# Patient Record
Sex: MALE | Race: BLACK OR AFRICAN AMERICAN | NOT HISPANIC OR LATINO | Employment: UNEMPLOYED | URBAN - METROPOLITAN AREA
[De-identification: names, ages, dates, MRNs, and addresses within clinical notes are randomized per-mention and may not be internally consistent; named-entity substitution may affect disease eponyms.]

---

## 2024-03-19 ENCOUNTER — OFFICE VISIT (OUTPATIENT)
Dept: CARDIOLOGY | Facility: CLINIC | Age: 64
End: 2024-03-19
Payer: MEDICARE

## 2024-03-19 VITALS
HEIGHT: 69 IN | DIASTOLIC BLOOD PRESSURE: 78 MMHG | SYSTOLIC BLOOD PRESSURE: 122 MMHG | WEIGHT: 168.06 LBS | OXYGEN SATURATION: 98 % | BODY MASS INDEX: 24.89 KG/M2 | HEART RATE: 61 BPM

## 2024-03-19 DIAGNOSIS — E78.5 HYPERLIPIDEMIA, UNSPECIFIED HYPERLIPIDEMIA TYPE: ICD-10-CM

## 2024-03-19 DIAGNOSIS — I10 HYPERTENSION, UNSPECIFIED TYPE: ICD-10-CM

## 2024-03-19 DIAGNOSIS — Z98.62 STATUS POST ANGIOPLASTY: ICD-10-CM

## 2024-03-19 DIAGNOSIS — F17.200 TOBACCO DEPENDENCE: Primary | ICD-10-CM

## 2024-03-19 DIAGNOSIS — I25.10 CORONARY ARTERY DISEASE INVOLVING NATIVE CORONARY ARTERY OF NATIVE HEART, UNSPECIFIED WHETHER ANGINA PRESENT: ICD-10-CM

## 2024-03-19 DIAGNOSIS — I42.9 CARDIOMYOPATHY, UNSPECIFIED TYPE (MULTI): ICD-10-CM

## 2024-03-19 PROBLEM — K62.5 BRBPR (BRIGHT RED BLOOD PER RECTUM): Status: ACTIVE | Noted: 2024-03-19

## 2024-03-19 PROBLEM — R93.1 ELEVATED CORONARY ARTERY CALCIUM SCORE: Status: ACTIVE | Noted: 2024-03-19

## 2024-03-19 PROBLEM — R07.9 CHEST PAIN: Status: ACTIVE | Noted: 2024-03-19

## 2024-03-19 PROBLEM — R10.32 SEVERE LEFT GROIN PAIN: Status: ACTIVE | Noted: 2024-03-19

## 2024-03-19 PROBLEM — R07.9 CHEST PAIN: Status: RESOLVED | Noted: 2024-03-19 | Resolved: 2024-03-19

## 2024-03-19 PROCEDURE — 3078F DIAST BP <80 MM HG: CPT | Performed by: INTERNAL MEDICINE

## 2024-03-19 PROCEDURE — 3074F SYST BP LT 130 MM HG: CPT | Performed by: INTERNAL MEDICINE

## 2024-03-19 PROCEDURE — 99214 OFFICE O/P EST MOD 30 MIN: CPT | Performed by: INTERNAL MEDICINE

## 2024-03-19 RX ORDER — METOPROLOL SUCCINATE 50 MG/1
1 TABLET, EXTENDED RELEASE ORAL DAILY
COMMUNITY
Start: 2019-04-05 | End: 2024-03-19 | Stop reason: SDUPTHER

## 2024-03-19 RX ORDER — AMOXICILLIN AND CLAVULANATE POTASSIUM 500; 125 MG/1; MG/1
TABLET, FILM COATED ORAL EVERY 12 HOURS
COMMUNITY
Start: 2019-04-22 | End: 2024-03-19 | Stop reason: ALTCHOICE

## 2024-03-19 RX ORDER — ACETAMINOPHEN AND CODEINE PHOSPHATE 300; 30 MG/1; MG/1
TABLET ORAL EVERY 8 HOURS PRN
COMMUNITY
Start: 2019-04-22 | End: 2024-03-19 | Stop reason: ALTCHOICE

## 2024-03-19 RX ORDER — AMLODIPINE BESYLATE 10 MG/1
1 TABLET ORAL DAILY
COMMUNITY
Start: 2019-05-03 | End: 2024-03-19 | Stop reason: SDUPTHER

## 2024-03-19 RX ORDER — NITROGLYCERIN 0.4 MG/1
TABLET SUBLINGUAL
COMMUNITY
Start: 2017-08-16

## 2024-03-19 RX ORDER — LISINOPRIL 40 MG/1
1 TABLET ORAL DAILY
COMMUNITY
Start: 2018-12-31 | End: 2024-03-19 | Stop reason: SDUPTHER

## 2024-03-19 RX ORDER — ASPIRIN 81 MG/1
TABLET ORAL
COMMUNITY
Start: 2017-08-01

## 2024-03-19 ASSESSMENT — ENCOUNTER SYMPTOMS
FALLS: 0
NAUSEA: 0
FEVER: 0
HEADACHES: 0
COUGH: 0
MYALGIAS: 0
BLOATING: 0
MEMORY LOSS: 0
CONSTIPATION: 0
DIARRHEA: 0
ABDOMINAL PAIN: 0
VOMITING: 0
WHEEZING: 0
ALTERED MENTAL STATUS: 0
DEPRESSION: 0
DYSURIA: 0
CHILLS: 0
HEMOPTYSIS: 0
HEMATURIA: 0

## 2024-03-19 ASSESSMENT — PAIN SCALES - GENERAL: PAINLEVEL: 0-NO PAIN

## 2024-03-19 NOTE — PROGRESS NOTES
Chief complaint:  FU     HPI  64 yo BM w/ h/o CAD s/p LCx/RCA stents 9/17, mild CM, AFLUT x1 (9/17), HPL, TOB now here for cardiology f/u. He is feeling good. No further CP. No further. No further L shoulder/neck pain.   No dyspnea. No palps. No LH/dizziness/syncope. No edema. No claudication. No cough.   BP at home: ~130/80  ECG 4/15: SR (81)  ECG 8/17: SR (71), nonsp T-wave changes  ECG 9/17: SB (54), inflat St-T changes  ECG 9/17: SB (59), PVC, ST-T changes  ECG 9/17: SB (53), ST-T changes  ECG 9/17: AFLUT (127)  ECG 9/18: SB (56), bigeminal PVCs, ST-T changes  ECG 9/19: SR (61), nonsp T-wave changes  ECG 10/22: SR (61), PVC, nonsp T-wave changes  Echo 9/17: EF 40-45%  Echo 8/21: TDS, EF 45%, DD, valves ok  ETT 8/17: CP + 2.5mm ST dep  CCS 8/17: 188 (LM 0, , LCx 0, RCA 72), small L lung nodularity  Cath 9/17: LM ok, mLAD  (L->L collaterals), pLCx 90% (BOWEN), OM1 , p-dRCA 80% (DESx2)  CXR 4/15: no acute abnl  CXR 9/17: no acute abnl     Review of Systems   Constitutional: Negative for chills, fever and malaise/fatigue.   HENT:  Negative for hearing loss.    Eyes:  Negative for visual disturbance.   Respiratory:  Negative for cough, hemoptysis and wheezing.    Skin:  Negative for rash.   Musculoskeletal:  Negative for falls and myalgias.   Gastrointestinal:  Negative for bloating, abdominal pain, constipation, diarrhea, dysphagia, nausea and vomiting.   Genitourinary:  Negative for dysuria and hematuria.   Neurological:  Negative for headaches.   Psychiatric/Behavioral:  Negative for altered mental status, depression and memory loss.       Social History     Tobacco Use   Smoking Status Every Day    Types: Cigarettes   Smokeless Tobacco Not on file      Family History   Problem Relation Name Age of Onset    Other (HTN) Mother      Other (HTN) Father        No Known Allergies     Current Outpatient Medications   Medication Instructions    amLODIPine (NORVASC) 10 mg, oral, Daily    aspirin 81 mg EC tablet  oral    atorvastatin (LIPITOR) 80 mg, oral, Daily    clopidogrel (PLAVIX) 75 mg, oral, Daily    lisinopril 80 mg, oral, Daily    metoprolol succinate XL (TOPROL-XL) 50 mg, oral, Daily    nitroglycerin (Nitrostat) 0.4 mg SL tablet sublingual      Vitals:    03/19/24 1529   BP: 122/78   Pulse:    SpO2:       Physical Exam  Constitutional:       Appearance: Normal appearance.   HENT:      Head: Normocephalic and atraumatic.      Nose: Nose normal.   Neck:      Vascular: No carotid bruit.   Cardiovascular:      Rate and Rhythm: Normal rate and regular rhythm.      Heart sounds: No murmur heard.  Pulmonary:      Effort: Pulmonary effort is normal.      Breath sounds: Normal breath sounds.   Abdominal:      Palpations: Abdomen is soft.      Tenderness: There is no abdominal tenderness.   Musculoskeletal:      Right lower leg: No edema.      Left lower leg: No edema.   Skin:     General: Skin is warm and dry.   Neurological:      General: No focal deficit present.      Mental Status: He is alert.   Psychiatric:         Mood and Affect: Mood normal.         Judgment: Judgment normal.        Results/Data  2/21 Cr 0.92, K 4.3, LFT nl, LDL 66, HDL 53, , Chol 160, HGB 15,   5/18 Cr 0.94, K 4.6, LFT nl, LDL 77, HDL 49, , Chol 158, HGB 15,   9/17 Cr 0.98, K 4.1, Mg 2.18, LFT nl, LDL **, HDL 36, , Chol 257, HGB 16.2, , hgba1c 5.7, TSH 1.54, TPN neg, BNP 85  4/15 Cr 0.87, K 3.6, HGB 13, , TPN 0.1     Assessment/Plan   62 yo BM w/ h/o CAD s/p LCx/RCA stents 9/17, mild CM, AFLUT x1 (9/17), HPL, TOB. Doing well. No cardiac symptoms. Refer to smoking cessation clinic.  EF remains very mildly reduced. Continue BB/ACE.  -continue ASA 81 qd  -continue Plavix 75 qd  -continue Metoprolol Succinate 50 qd (HR occ low to increase)  -continue Lisinopril 80 qd  -continue Amlodipine 10 qd  -continue Atorva 80 qhs -> goal LDL <70  -f/u 6 months (earlier if needed)     Tee Jason MD

## 2024-03-27 ENCOUNTER — CLINICAL SUPPORT (OUTPATIENT)
Dept: CARDIAC REHAB | Facility: CLINIC | Age: 64
End: 2024-03-27
Payer: MEDICARE

## 2024-03-27 DIAGNOSIS — F17.210 CIGARETTE SMOKER: ICD-10-CM

## 2024-03-27 DIAGNOSIS — F17.200 TOBACCO DEPENDENCE: ICD-10-CM

## 2024-03-27 PROCEDURE — 99407 BEHAV CHNG SMOKING > 10 MIN: CPT | Performed by: INTERNAL MEDICINE

## 2024-03-29 NOTE — PROGRESS NOTES
Tobacco Treatment Counseling- Initial Visit    Name: Tee Oneill            MRN: [id number]          YOB: 1960           Age: 63 y.o.                  Today’s Date: 3/29/2024  Primary Care Physician: No primary care provider on file.  Referring Physician: Tee Jason MD  [facility] [place] [id number]         Start time: 7:59 AM   End time: 8:35 AM      Tee Oneill presents for initial session for Tobacco Treatment Counseling. Patient currently smoking 1 PPD and has been smoking since the age of 14. Patient has a high dependence on nicotine according to the [employer] nicotine dependence assessment. At this time, patient is highly motivated to quit smoking. He is a [employer] and he says that they are not supposed to smoke, so he would really like to quit to be compliant with this. See below for detailed assessment of tobacco use and treatment plan.       Smoking triggers/routines:  driving (drives often to visit family and friends in [place]), coffee, alcohol (drinks a beer every day), work breaks (works as a ), stress/anxiety, frustration    Past quit attempts:  several years ago, does not remember much about quit attempt    Potential barriers to quitting:  does smoke inside the home, high dependence    Strengths:  high motivation/commitment    Medication plan:  patient has used nicotine patches in the past, will consider use of NRT again if needed    Coping strategies:  portioning out cigarettes every day, tracking cigarettes, substitutes/replacements (straws, toothpicks, beverage stirrers, etc.)    Next steps:  reduce to quit method, goal for this week is to cut down to 10 CPD (will portion out cigarettes as well as track smoking) >> patient also interested in [facility] [place], gave information    Follow-up scheduled 4/3/24 to evaluate progress and make any necessary changes to quit plan. Patient to call office at [phone] with any  questions/concerns.    Tobacco Treatment Specialist- Brittni Ventura RN

## 2024-05-02 ENCOUNTER — OFFICE VISIT (OUTPATIENT)
Dept: PRIMARY CARE | Facility: CLINIC | Age: 64
End: 2024-05-02
Payer: COMMERCIAL

## 2024-05-02 VITALS
BODY MASS INDEX: 25.2 KG/M2 | OXYGEN SATURATION: 95 % | SYSTOLIC BLOOD PRESSURE: 114 MMHG | DIASTOLIC BLOOD PRESSURE: 78 MMHG | HEIGHT: 69 IN | WEIGHT: 170.1 LBS | HEART RATE: 73 BPM | RESPIRATION RATE: 16 BRPM

## 2024-05-02 DIAGNOSIS — E78.5 HYPERLIPIDEMIA, UNSPECIFIED HYPERLIPIDEMIA TYPE: ICD-10-CM

## 2024-05-02 DIAGNOSIS — F10.20 UNCOMPLICATED ALCOHOL DEPENDENCE (MULTI): ICD-10-CM

## 2024-05-02 DIAGNOSIS — Z12.11 COLON CANCER SCREENING: Primary | ICD-10-CM

## 2024-05-02 DIAGNOSIS — Z98.62 STATUS POST ANGIOPLASTY: ICD-10-CM

## 2024-05-02 DIAGNOSIS — Z00.00 ANNUAL PHYSICAL EXAM: ICD-10-CM

## 2024-05-02 DIAGNOSIS — I25.10 CORONARY ARTERY DISEASE INVOLVING NATIVE CORONARY ARTERY OF NATIVE HEART, UNSPECIFIED WHETHER ANGINA PRESENT: ICD-10-CM

## 2024-05-02 DIAGNOSIS — R53.83 OTHER FATIGUE: ICD-10-CM

## 2024-05-02 DIAGNOSIS — I10 PRIMARY HYPERTENSION: ICD-10-CM

## 2024-05-02 DIAGNOSIS — F17.211 TOBACCO DEPENDENCE DUE TO CIGARETTES, IN REMISSION: ICD-10-CM

## 2024-05-02 DIAGNOSIS — N40.0 BENIGN PROSTATIC HYPERPLASIA WITHOUT LOWER URINARY TRACT SYMPTOMS: ICD-10-CM

## 2024-05-02 DIAGNOSIS — F17.200 TOBACCO DEPENDENCE SYNDROME: ICD-10-CM

## 2024-05-02 DIAGNOSIS — K62.5 BRBPR (BRIGHT RED BLOOD PER RECTUM): ICD-10-CM

## 2024-05-02 PROCEDURE — 3078F DIAST BP <80 MM HG: CPT | Performed by: INTERNAL MEDICINE

## 2024-05-02 PROCEDURE — 99386 PREV VISIT NEW AGE 40-64: CPT | Performed by: INTERNAL MEDICINE

## 2024-05-02 PROCEDURE — 99214 OFFICE O/P EST MOD 30 MIN: CPT | Performed by: INTERNAL MEDICINE

## 2024-05-02 PROCEDURE — 3074F SYST BP LT 130 MM HG: CPT | Performed by: INTERNAL MEDICINE

## 2024-05-02 ASSESSMENT — ENCOUNTER SYMPTOMS
ALLERGIC/IMMUNOLOGIC NEGATIVE: 1
NEUROLOGICAL NEGATIVE: 1
CONSTITUTIONAL NEGATIVE: 1
CARDIOVASCULAR NEGATIVE: 1
HEMATOLOGIC/LYMPHATIC NEGATIVE: 1
EYES NEGATIVE: 1
GASTROINTESTINAL NEGATIVE: 1
OCCASIONAL FEELINGS OF UNSTEADINESS: 0
DEPRESSION: 0
RESPIRATORY NEGATIVE: 1
LOSS OF SENSATION IN FEET: 0
PSYCHIATRIC NEGATIVE: 1
MUSCULOSKELETAL NEGATIVE: 1
ENDOCRINE NEGATIVE: 1

## 2024-05-02 ASSESSMENT — PAIN SCALES - GENERAL: PAINLEVEL: 0-NO PAIN

## 2024-05-02 ASSESSMENT — PATIENT HEALTH QUESTIONNAIRE - PHQ9
2. FEELING DOWN, DEPRESSED OR HOPELESS: NOT AT ALL
SUM OF ALL RESPONSES TO PHQ9 QUESTIONS 1 AND 2: 0
1. LITTLE INTEREST OR PLEASURE IN DOING THINGS: NOT AT ALL

## 2024-05-02 NOTE — ASSESSMENT & PLAN NOTE
HTN - hypertension well/controlled .Target BP < 130/80  achieved. Educate low salt diet and exercise with weight loss. Educate home self monitoring and diary keeping. Educate risks of elevate blood pressure and benefits of prompt treatment.  Refill lisinopril and amlodipine

## 2024-05-02 NOTE — ASSESSMENT & PLAN NOTE
CAD-coronary artery disease-patient has a history of myocardial infarction/stent placed in stenosed coronary arteries. Target LDL should be below 70 milligrams per deciliter. Reviewed EKG which shows no significant changes. Follows with his cardiologist/ Dr. Huizar. He needs to call us with any new symptoms of angina or shortness of breath. Last stress test was/last coronary catheterization was/. Need to address risk factors BioCore controlling cholesterol blood pressure and diabetes. Educate extensively diet. Patient needs to follow in rehabilitation/mild exercises daily.

## 2024-05-02 NOTE — PROGRESS NOTES
"Subjective   Patient ID: Tee Oneill is a 63 y.o. male who presents for New Patient Visit (Npv/Interested in a colonoscopy).    HPI     Review of Systems   Constitutional: Negative.    HENT: Negative.     Eyes: Negative.    Respiratory: Negative.     Cardiovascular: Negative.    Gastrointestinal: Negative.    Endocrine: Negative.    Musculoskeletal: Negative.    Skin: Negative.    Allergic/Immunologic: Negative.    Neurological: Negative.    Hematological: Negative.    Psychiatric/Behavioral: Negative.     All other systems reviewed and are negative.      Objective   /78 (BP Location: Left arm, Patient Position: Sitting)   Pulse 73   Resp 16   Ht 1.753 m (5' 9\")   Wt 77.2 kg (170 lb 1.6 oz)   SpO2 95%   BMI 25.12 kg/m²     Physical Exam  Vitals and nursing note reviewed.   Constitutional:       Appearance: Normal appearance.   HENT:      Head: Normocephalic and atraumatic.      Right Ear: Tympanic membrane, ear canal and external ear normal.      Left Ear: Tympanic membrane, ear canal and external ear normal. There is no impacted cerumen.      Nose: Nose normal.      Mouth/Throat:      Mouth: Mucous membranes are moist.      Pharynx: Oropharynx is clear.   Eyes:      Extraocular Movements: Extraocular movements intact.      Conjunctiva/sclera: Conjunctivae normal.      Pupils: Pupils are equal, round, and reactive to light.   Cardiovascular:      Rate and Rhythm: Normal rate and regular rhythm.      Pulses: Normal pulses.      Heart sounds: Normal heart sounds. No murmur heard.  Pulmonary:      Effort: Pulmonary effort is normal. No respiratory distress.      Breath sounds: Normal breath sounds. No stridor. No wheezing, rhonchi or rales.   Chest:      Chest wall: No tenderness.   Abdominal:      General: Abdomen is flat. Bowel sounds are normal. There is no distension.      Palpations: Abdomen is soft. There is no mass.      Tenderness: There is no abdominal tenderness. There is no right CVA " tenderness, left CVA tenderness, guarding or rebound.      Hernia: No hernia is present.   Musculoskeletal:         General: Normal range of motion.      Cervical back: Normal range of motion and neck supple.   Skin:     General: Skin is warm.      Capillary Refill: Capillary refill takes less than 2 seconds.   Neurological:      General: No focal deficit present.      Mental Status: He is alert.      Cranial Nerves: No cranial nerve deficit.      Sensory: No sensory deficit.      Motor: No weakness.      Coordination: Coordination normal.      Gait: Gait normal.      Deep Tendon Reflexes: Reflexes normal.   Psychiatric:         Mood and Affect: Mood normal.         Behavior: Behavior normal. Behavior is cooperative.         Thought Content: Thought content normal.         Judgment: Judgment normal.         Assessment/Plan   Problem List Items Addressed This Visit             ICD-10-CM    BRBPR (bright red blood per rectum) K62.5     Refer to colonoscopy          CAD (coronary artery disease) I25.10     CAD-coronary artery disease-patient has a history of myocardial infarction/stent placed in stenosed coronary arteries. Target LDL should be below 70 milligrams per deciliter. Reviewed EKG which shows no significant changes. Follows with his cardiologist/ Dr. Huizar. He needs to call us with any new symptoms of angina or shortness of breath. Last stress test was/last coronary catheterization was/. Need to address risk factors BioCore controlling cholesterol blood pressure and diabetes. Educate extensively diet. Patient needs to follow in rehabilitation/mild exercises daily.          Relevant Orders    Comprehensive Metabolic Panel    Lipid Panel    HTN (hypertension) I10     HTN - hypertension well/controlled .Target BP < 130/80  achieved. Educate low salt diet and exercise with weight loss. Educate home self monitoring and diary keeping. Educate risks of elevate blood pressure and benefits of prompt treatment.   Refill lisinopril and amlodipine         Hyperlipidemia E78.5     Hypercholesterolemia - Monitor lipid profile and educate patient upon risks of high cholesterol and targets. Educate diet and change in lifestyle and increase in exercises - Refill:  Atorvastatin    and educate compliance with medication and diet.           Relevant Orders    Lipid Panel    Status post angioplasty Z98.62     Status Post coronary stenting in 2019          Uncomplicated alcohol dependence (Multi) F10.20    Tobacco dependence syndrome F17.200     Quit tobacco one month ago          Relevant Orders    CT lung screening low dose    Other fatigue R53.83    Relevant Orders    CBC and Auto Differential    TSH with reflex to Free T4 if abnormal    Tobacco dependence due to cigarettes, in remission F17.211    Relevant Orders    CT lung screening low dose    Annual physical exam Z00.00     No recent hospitalizations.    All medications reviewed and reconciled by me the provider..  No use of controlled substances or opiates.    Family history, social history reviewed, no changes.    Patient does not smoke.    Patient does not drink.    Patient hydrates adequately daily.  Eats a well-balanced healthy diet.     Exercises adequately including walking and doing weightbearing exercises.    Patient denies any difficulty with memory or cognition.     Denies any difficulty with hearing.  Patient does not wear hearing aids.    No fall risk.  No recent falls.  Denies any difficulty walking.    Patient with no history of depression anxiety, denies any loss of interest, no feeling of sadness, no lack of motivation.    Patient is independent in all ADLs and IADLs.  Independent bathing, dressing, walking.  Takes care of own finances, shopping and cooking.     End-of-life decision-making power of  reviewed with patient.     Risk Factors Identified During Visit: None.   Influenza: influenza vaccine was previously given.   Pneumovax 23: Pneumovax 23  vaccine was previously given.   Prevnar 13: Prevnar 13 vaccine was previously given.   Shingles Vaccine: Shingles vaccine was previously given.   Prostate cancer screening: Screening is current.   Colorectal Cancer Screening: screening is current.   Abdominal Aortic Aneurysm screening: screening is current.   HIV screening: screening not indicated.       Preventive measures - Recommend ASAP : . Colonoscopy (educate patient risks of colon cancer) refer patient to GI specialist. Ophthalmology and retina exam recommend yearly exams refer patient to an Ophthalmologist. BPH - (Benign Prostatic Hypertrophy) refer patient to an Urologist for rectal exam and PSA check.           Other Visit Diagnoses         Codes    Colon cancer screening    -  Primary Z12.11    Relevant Orders    Colonoscopy Screening; Average Risk Patient    Benign prostatic hyperplasia without lower urinary tract symptoms     N40.0    Relevant Orders    Prostate Specific Antigen

## 2024-05-14 ENCOUNTER — HOSPITAL ENCOUNTER (OUTPATIENT)
Dept: RADIOLOGY | Facility: CLINIC | Age: 64
Discharge: HOME | End: 2024-05-14
Payer: COMMERCIAL

## 2024-05-14 ENCOUNTER — APPOINTMENT (OUTPATIENT)
Dept: PRIMARY CARE | Facility: CLINIC | Age: 64
End: 2024-05-14
Payer: COMMERCIAL

## 2024-05-14 DIAGNOSIS — F17.200 TOBACCO DEPENDENCE SYNDROME: ICD-10-CM

## 2024-05-14 DIAGNOSIS — F17.211 TOBACCO DEPENDENCE DUE TO CIGARETTES, IN REMISSION: ICD-10-CM

## 2024-05-14 PROCEDURE — 71271 CT THORAX LUNG CANCER SCR C-: CPT

## 2024-05-30 ENCOUNTER — OFFICE VISIT (OUTPATIENT)
Dept: PRIMARY CARE | Facility: CLINIC | Age: 64
End: 2024-05-30
Payer: COMMERCIAL

## 2024-05-30 VITALS
HEIGHT: 69 IN | HEART RATE: 58 BPM | SYSTOLIC BLOOD PRESSURE: 120 MMHG | DIASTOLIC BLOOD PRESSURE: 70 MMHG | RESPIRATION RATE: 16 BRPM | WEIGHT: 172 LBS | BODY MASS INDEX: 25.48 KG/M2 | OXYGEN SATURATION: 97 %

## 2024-05-30 DIAGNOSIS — I25.10 CORONARY ARTERY DISEASE INVOLVING NATIVE CORONARY ARTERY OF NATIVE HEART, UNSPECIFIED WHETHER ANGINA PRESENT: ICD-10-CM

## 2024-05-30 DIAGNOSIS — R35.0 BENIGN PROSTATIC HYPERPLASIA WITH URINARY FREQUENCY: ICD-10-CM

## 2024-05-30 DIAGNOSIS — I10 PRIMARY HYPERTENSION: Primary | ICD-10-CM

## 2024-05-30 DIAGNOSIS — E78.5 HYPERLIPIDEMIA, UNSPECIFIED HYPERLIPIDEMIA TYPE: ICD-10-CM

## 2024-05-30 DIAGNOSIS — F17.200 TOBACCO DEPENDENCE SYNDROME: ICD-10-CM

## 2024-05-30 DIAGNOSIS — N40.1 BENIGN PROSTATIC HYPERPLASIA WITH URINARY FREQUENCY: ICD-10-CM

## 2024-05-30 PROBLEM — N40.0 BENIGN PROSTATIC HYPERPLASIA: Status: ACTIVE | Noted: 2024-05-30

## 2024-05-30 PROCEDURE — 3078F DIAST BP <80 MM HG: CPT | Performed by: INTERNAL MEDICINE

## 2024-05-30 PROCEDURE — 99214 OFFICE O/P EST MOD 30 MIN: CPT | Performed by: INTERNAL MEDICINE

## 2024-05-30 PROCEDURE — 3074F SYST BP LT 130 MM HG: CPT | Performed by: INTERNAL MEDICINE

## 2024-05-30 ASSESSMENT — ENCOUNTER SYMPTOMS
LOSS OF SENSATION IN FEET: 0
HEMATOLOGIC/LYMPHATIC NEGATIVE: 1
ENDOCRINE NEGATIVE: 1
RESPIRATORY NEGATIVE: 1
CARDIOVASCULAR NEGATIVE: 1
DEPRESSION: 0
MUSCULOSKELETAL NEGATIVE: 1
OCCASIONAL FEELINGS OF UNSTEADINESS: 0
PSYCHIATRIC NEGATIVE: 1
CONSTITUTIONAL NEGATIVE: 1
EYES NEGATIVE: 1
GASTROINTESTINAL NEGATIVE: 1
NEUROLOGICAL NEGATIVE: 1
ALLERGIC/IMMUNOLOGIC NEGATIVE: 1

## 2024-05-30 ASSESSMENT — PATIENT HEALTH QUESTIONNAIRE - PHQ9
1. LITTLE INTEREST OR PLEASURE IN DOING THINGS: NOT AT ALL
SUM OF ALL RESPONSES TO PHQ9 QUESTIONS 1 AND 2: 0
2. FEELING DOWN, DEPRESSED OR HOPELESS: NOT AT ALL

## 2024-05-30 NOTE — PROGRESS NOTES
"Subjective   Patient ID: Tee Oneill is a 63 y.o. male who presents for Follow-up (1 month).    HPI     Review of Systems   Constitutional: Negative.    HENT: Negative.     Eyes: Negative.    Respiratory: Negative.     Cardiovascular: Negative.    Gastrointestinal: Negative.    Endocrine: Negative.    Musculoskeletal: Negative.    Skin: Negative.    Allergic/Immunologic: Negative.    Neurological: Negative.    Hematological: Negative.    Psychiatric/Behavioral: Negative.     All other systems reviewed and are negative.      Objective   Pulse 58   Resp 16   Ht 1.753 m (5' 9\")   Wt 78 kg (172 lb)   SpO2 97%   BMI 25.40 kg/m²   Blood pressure 120/70, pulse 58, resp. rate 16, height 1.753 m (5' 9\"), weight 78 kg (172 lb), SpO2 97%.   Physical Exam  Vitals and nursing note reviewed.   Constitutional:       Appearance: Normal appearance.   HENT:      Head: Normocephalic and atraumatic.      Right Ear: Tympanic membrane, ear canal and external ear normal.      Left Ear: Tympanic membrane, ear canal and external ear normal. There is no impacted cerumen.      Nose: Nose normal.      Mouth/Throat:      Mouth: Mucous membranes are moist.      Pharynx: Oropharynx is clear.   Eyes:      Extraocular Movements: Extraocular movements intact.      Conjunctiva/sclera: Conjunctivae normal.      Pupils: Pupils are equal, round, and reactive to light.   Cardiovascular:      Rate and Rhythm: Normal rate and regular rhythm.      Pulses: Normal pulses.      Heart sounds: Normal heart sounds. No murmur heard.  Pulmonary:      Effort: Pulmonary effort is normal. No respiratory distress.      Breath sounds: Normal breath sounds. No stridor. No wheezing, rhonchi or rales.   Chest:      Chest wall: No tenderness.   Abdominal:      General: Abdomen is flat. Bowel sounds are normal. There is no distension.      Palpations: Abdomen is soft. There is no mass.      Tenderness: There is no abdominal tenderness. There is no right CVA " tenderness, left CVA tenderness, guarding or rebound.      Hernia: No hernia is present.   Musculoskeletal:         General: Normal range of motion.      Cervical back: Normal range of motion and neck supple.   Skin:     General: Skin is warm.      Capillary Refill: Capillary refill takes less than 2 seconds.   Neurological:      General: No focal deficit present.      Mental Status: He is alert.      Cranial Nerves: No cranial nerve deficit.      Sensory: No sensory deficit.      Motor: No weakness.      Coordination: Coordination normal.      Gait: Gait normal.      Deep Tendon Reflexes: Reflexes normal.   Psychiatric:         Mood and Affect: Mood normal.         Behavior: Behavior normal. Behavior is cooperative.         Thought Content: Thought content normal.         Judgment: Judgment normal.         Assessment/Plan   Problem List Items Addressed This Visit             ICD-10-CM    CAD (coronary artery disease) I25.10     CAD-coronary artery disease-patient has a history of myocardial infarction/stent placed in stenosed coronary arteries. Target LDL should be below 70 milligrams per deciliter. Reviewed EKG which shows no significant changes. Follows with his cardiologist/ Dr. Huizar. He needs to call us with any new symptoms of angina or shortness of breath. Last stress test was/last coronary catheterization was/. Need to address risk factors BioCore controlling cholesterol blood pressure and diabetes. Educate extensively diet. Patient needs to follow in rehabilitation/mild exercises daily.          HTN (hypertension) - Primary I10     HTN - hypertension well/controlled .Target BP < 130/80  achieved. Educate low salt diet and exercise with weight loss. Educate home self monitoring and diary keeping. Educate risks of elevate blood pressure and benefits of prompt treatment.  Refill lisinopril and amlodipine          Hyperlipidemia E78.5     Hypercholesterolemia - Monitor lipid profile and educate patient upon  risks of high cholesterol and targets. Educate diet and change in lifestyle and increase in exercises - Refill:  Atorvastatin    and educate compliance with medication and diet.           Tobacco dependence syndrome F17.200     Tobacco cessation - Educate risks of smoking (CAD/COPD/CANCER of the lung or urinary bladder/CVA). Educate life style changes and prescribe nicotine patch and Wellbutrin 150mg BID. Educate stress reduction.                                                                                      Benign prostatic hyperplasia N40.0     BPH - Benign PROSTATIC Hypertrophy, + Dysuria/Nocturia, check PSA, prescribe Flomax 0.4mg qD and Avodart 0.5 mg qD vs. Finasteride 5 mg qD.  Educate exercises and Change in life style, prescribe vitamin E 400 IU qD. Consider referral to urology.             BRBPR (bright red blood per rectum) K62.5        Refer to colonoscopy            CAD (coronary artery disease) I25.10       CAD-coronary artery disease-patient has a history of myocardial infarction/stent placed in stenosed coronary arteries. Target LDL should be below 70 milligrams per deciliter. Reviewed EKG which shows no significant changes. Follows with his cardiologist/ Dr. Huizar. He needs to call us with any new symptoms of angina or shortness of breath. Last stress test was/last coronary catheterization was/. Need to address risk factors BioCore controlling cholesterol blood pressure and diabetes. Educate extensively diet. Patient needs to follow in rehabilitation/mild exercises daily.            Relevant Orders     Comprehensive Metabolic Panel     Lipid Panel     HTN (hypertension) I10       HTN - hypertension well/controlled .Target BP < 130/80  achieved. Educate low salt diet and exercise with weight loss. Educate home self monitoring and diary keeping. Educate risks of elevate blood pressure and benefits of prompt treatment.  Refill lisinopril and amlodipine           Hyperlipidemia E78.5        Hypercholesterolemia - Monitor lipid profile and educate patient upon risks of high cholesterol and targets. Educate diet and change in lifestyle and increase in exercises - Refill:  Atorvastatin    and educate compliance with medication and diet.             Relevant Orders     Lipid Panel     Status post angioplasty Z98.62       Status Post coronary stenting in 2019            Uncomplicated alcohol dependence (Multi) F10.20     Tobacco dependence syndrome F17.200       Quit tobacco one month ago            Relevant Orders     CT lung screening low dose     Other fatigue R53.83     Relevant Orders     CBC and Auto Differential     TSH with reflex to Free T4 if abnormal     Tobacco dependence due to cigarettes, in remission F17.211     Relevant Orders     CT lung screening low dose                 Other Visit Diagnoses           Codes     Colon cancer screening    -  Primary Z12.11     Relevant Orders     Colonoscopy Screening; Average Risk Patient     Benign prostatic hyperplasia without lower urinary tract symptoms     N40.0     Relevant Orders     Prostate Specific Antigen                            Immunizations/Injections      very important  Immunizations from outside sources need reconciliation.      Pfizer Purple Cap SARS-CoV-211/18/2021, 4/6/2021, 3/16/2021  Zoster vaccine, recombinant, adult (SHINGRIX)9/12/2021, 4/29/2021

## 2024-05-31 ENCOUNTER — OFFICE VISIT (OUTPATIENT)
Dept: GASTROENTEROLOGY | Facility: CLINIC | Age: 64
End: 2024-05-31
Payer: COMMERCIAL

## 2024-05-31 VITALS — OXYGEN SATURATION: 97 % | WEIGHT: 172 LBS | BODY MASS INDEX: 25.48 KG/M2 | HEIGHT: 69 IN | HEART RATE: 60 BPM

## 2024-05-31 DIAGNOSIS — K62.5 BRBPR (BRIGHT RED BLOOD PER RECTUM): Primary | ICD-10-CM

## 2024-05-31 DIAGNOSIS — I42.9 CARDIOMYOPATHY, UNSPECIFIED TYPE (MULTI): ICD-10-CM

## 2024-05-31 PROCEDURE — 99204 OFFICE O/P NEW MOD 45 MIN: CPT

## 2024-05-31 RX ORDER — POLYETHYLENE GLYCOL 3350, SODIUM SULFATE ANHYDROUS, SODIUM BICARBONATE, SODIUM CHLORIDE, POTASSIUM CHLORIDE 236; 22.74; 6.74; 5.86; 2.97 G/4L; G/4L; G/4L; G/4L; G/4L
4000 POWDER, FOR SOLUTION ORAL ONCE
Qty: 4000 ML | Refills: 0 | Status: SHIPPED | OUTPATIENT
Start: 2024-05-31 | End: 2024-05-31

## 2024-05-31 ASSESSMENT — ENCOUNTER SYMPTOMS
COUGH: 0
ABDOMINAL DISTENTION: 0
VOMITING: 0
FEVER: 0
NAUSEA: 0
CHILLS: 0
DIARRHEA: 0
CONSTIPATION: 0
BLOOD IN STOOL: 0
TROUBLE SWALLOWING: 0
ABDOMINAL PAIN: 0
RECTAL PAIN: 0
SHORTNESS OF BREATH: 0
FATIGUE: 0
APPETITE CHANGE: 0
ANAL BLEEDING: 1

## 2024-05-31 NOTE — PROGRESS NOTES
Subjective     History of Present Illness:   Tee Oneill is a 63 y.o. male with PMHx of CAD on Plavix, cardiomyopathy, HTN, EtOH dependence, tobacco dependence who presents to GI clinic for further evaluation colonoscopy consult    Today, patient states he is here to discuss scheduling colonoscopy.  He is on.  He has never had one before.   Patient states he was straining to move bowels last week and has a small amt of blood on tissue. Moves bowels daily to twice daily with formed brown stool.  Denies constipation, diarrhea, dyspepsia, melena, hematochezia, dysphagia, unintentional weight loss    Denies ETOH, quit smoking,denies marijuana  Denies fxh GI cancer or IBD  Abdominal Surgeries: denies    Denies colonoscopy   Denies EGD       Past Medical History  As per HPI.     Social History  he  reports that he quit smoking about 2 months ago. His smoking use included cigarettes. He has never used smokeless tobacco. He reports current alcohol use. He reports that he does not use drugs.     Family History  his family history includes HTN in his father and mother.     Review of Systems  Review of Systems   Constitutional:  Negative for appetite change, chills, fatigue and fever.   HENT:  Negative for trouble swallowing.    Respiratory:  Negative for cough and shortness of breath.    Gastrointestinal:  Positive for anal bleeding. Negative for abdominal distention, abdominal pain, blood in stool, constipation, diarrhea, nausea, rectal pain and vomiting.       Allergies  No Known Allergies    Medications  Current Outpatient Medications   Medication Instructions    amLODIPine (NORVASC) 10 mg, oral, Daily    aspirin 81 mg EC tablet oral    atorvastatin (LIPITOR) 80 mg, oral, Daily    clopidogrel (PLAVIX) 75 mg, oral, Daily    lisinopril 80 mg, oral, Daily    metoprolol succinate XL (TOPROL-XL) 50 mg, oral, Daily    nitroglycerin (Nitrostat) 0.4 mg SL tablet sublingual    polyethylene glycol-electrolytes (Golytely) 420 gram  solution 4,000 mL, oral, Once, Drink 1/2 starting at 6:00 pm the night prior to the procedure and the other 1/2 5 hours prior to the procedure.        Objective   Visit Vitals  Pulse 60      Physical Exam  Constitutional:       Appearance: Normal appearance. He is normal weight.   HENT:      Mouth/Throat:      Mouth: Mucous membranes are dry.      Pharynx: Oropharynx is clear.   Cardiovascular:      Rate and Rhythm: Normal rate and regular rhythm.   Pulmonary:      Effort: Pulmonary effort is normal.      Breath sounds: Normal breath sounds. No wheezing or rhonchi.   Abdominal:      General: Abdomen is flat. Bowel sounds are normal. There is no distension.      Palpations: Abdomen is soft. There is no hepatomegaly.      Tenderness: There is no abdominal tenderness. There is no guarding or rebound. Negative signs include Lux's sign.      Hernia: No hernia is present.   Musculoskeletal:         General: Normal range of motion.   Skin:     General: Skin is warm and dry.   Neurological:      General: No focal deficit present.      Mental Status: He is alert and oriented to person, place, and time.   Psychiatric:         Mood and Affect: Mood normal.         Behavior: Behavior normal.           Lab Results   Component Value Date    WBC 6.2 02/09/2021    WBC 6.6 05/15/2018    HGB 15.0 02/09/2021    HGB 15.0 05/15/2018    HCT 46.1 02/09/2021    HCT 46.9 05/15/2018     02/09/2021     05/15/2018     Lab Results   Component Value Date     02/09/2021     05/15/2018    K 4.3 02/09/2021    K 4.6 05/15/2018     02/09/2021     05/15/2018    CO2 29 02/09/2021    CO2 29 05/15/2018    BUN 11 02/09/2021    BUN 15 05/15/2018    CREATININE 0.92 02/09/2021    CREATININE 0.94 05/15/2018    CALCIUM 9.8 02/09/2021    CALCIUM 9.9 05/15/2018    PROT 7.4 02/09/2021    PROT 7.0 05/15/2018    BILITOT 0.8 02/09/2021    BILITOT 0.7 05/15/2018    ALKPHOS 83 02/09/2021    ALKPHOS 73 05/15/2018    ALT 20  02/09/2021    ALT 21 05/15/2018    AST 21 02/09/2021    AST 24 05/15/2018    GLUCOSE 101 (H) 02/09/2021    GLUCOSE 92 05/15/2018           Tee Oneill is a 63 y.o. male who presents to GI clinic for colonoscopy.    BRBPR (bright red blood per rectum)  Consider hemorrhoidal bleeding, rule out GI malignancy, less likely IBD  -Daily fiber supplement recommended  -Schedule colonoscopy at [facility] with GoLytely prep.  Patient to hold Plavix 5 days prior to procedure.  Confirmed with cardiology  Follow-up as needed         Gertrude Jimenez, APRN-CNP

## 2024-05-31 NOTE — PATIENT INSTRUCTIONS
Please schedule your colonoscopy. You will need a ride since this involves sedation.  I will send a bowel prep to your pharmacy.  Clear liquid diet the day before the procedure. Start the 1st part of the prep at 6 pm the night prior and the other half 5 hrs before the procedure.  Please hold Plavix 5 days prior to your procedure.  Confirm with prescribing physician.  Please take 1-2 spoonfuls daily of fiber.  Dissolve in 8 oz liquid or you may use fiber gummies.  This is to help maintain stool consistency.

## 2024-05-31 NOTE — ASSESSMENT & PLAN NOTE
Consider hemorrhoidal bleeding, rule out GI malignancy, less likely IBD  -Daily fiber supplement recommended  -Schedule colonoscopy at [facility] with GoLytely prep.  Patient to hold Plavix 5 days prior to procedure.  Confirmed with cardiology  Follow-up as needed

## 2024-06-03 ENCOUNTER — LAB (OUTPATIENT)
Dept: LAB | Facility: LAB | Age: 64
End: 2024-06-03
Payer: COMMERCIAL

## 2024-06-03 DIAGNOSIS — E78.5 HYPERLIPIDEMIA, UNSPECIFIED HYPERLIPIDEMIA TYPE: ICD-10-CM

## 2024-06-03 DIAGNOSIS — I25.10 CORONARY ARTERY DISEASE INVOLVING NATIVE CORONARY ARTERY OF NATIVE HEART, UNSPECIFIED WHETHER ANGINA PRESENT: ICD-10-CM

## 2024-06-03 DIAGNOSIS — R53.83 OTHER FATIGUE: ICD-10-CM

## 2024-06-03 DIAGNOSIS — N40.0 BENIGN PROSTATIC HYPERPLASIA WITHOUT LOWER URINARY TRACT SYMPTOMS: ICD-10-CM

## 2024-06-03 LAB
ALBUMIN SERPL BCP-MCNC: 4.8 G/DL (ref 3.4–5)
ALP SERPL-CCNC: 83 U/L (ref 33–136)
ALT SERPL W P-5'-P-CCNC: 18 U/L (ref 10–52)
ANION GAP SERPL CALC-SCNC: 16 MMOL/L (ref 10–20)
AST SERPL W P-5'-P-CCNC: 19 U/L (ref 9–39)
BASOPHILS # BLD AUTO: 0.02 X10*3/UL (ref 0–0.1)
BASOPHILS NFR BLD AUTO: 0.4 %
BILIRUB SERPL-MCNC: 0.7 MG/DL (ref 0–1.2)
BUN SERPL-MCNC: 16 MG/DL (ref 6–23)
CALCIUM SERPL-MCNC: 9.7 MG/DL (ref 8.6–10.6)
CHLORIDE SERPL-SCNC: 104 MMOL/L (ref 98–107)
CHOLEST SERPL-MCNC: 234 MG/DL (ref 0–199)
CHOLESTEROL/HDL RATIO: 3.8
CO2 SERPL-SCNC: 27 MMOL/L (ref 21–32)
CREAT SERPL-MCNC: 1 MG/DL (ref 0.5–1.3)
EGFRCR SERPLBLD CKD-EPI 2021: 86 ML/MIN/1.73M*2
EOSINOPHIL # BLD AUTO: 0.28 X10*3/UL (ref 0–0.7)
EOSINOPHIL NFR BLD AUTO: 5.3 %
ERYTHROCYTE [DISTWIDTH] IN BLOOD BY AUTOMATED COUNT: 13.8 % (ref 11.5–14.5)
GLUCOSE SERPL-MCNC: 91 MG/DL (ref 74–99)
HCT VFR BLD AUTO: 43.3 % (ref 41–52)
HDLC SERPL-MCNC: 61.2 MG/DL
HGB BLD-MCNC: 14.2 G/DL (ref 13.5–17.5)
IMM GRANULOCYTES # BLD AUTO: 0.01 X10*3/UL (ref 0–0.7)
IMM GRANULOCYTES NFR BLD AUTO: 0.2 % (ref 0–0.9)
LDLC SERPL CALC-MCNC: 137 MG/DL
LYMPHOCYTES # BLD AUTO: 1.9 X10*3/UL (ref 1.2–4.8)
LYMPHOCYTES NFR BLD AUTO: 35.8 %
MCH RBC QN AUTO: 30 PG (ref 26–34)
MCHC RBC AUTO-ENTMCNC: 32.8 G/DL (ref 32–36)
MCV RBC AUTO: 92 FL (ref 80–100)
MONOCYTES # BLD AUTO: 0.51 X10*3/UL (ref 0.1–1)
MONOCYTES NFR BLD AUTO: 9.6 %
NEUTROPHILS # BLD AUTO: 2.58 X10*3/UL (ref 1.2–7.7)
NEUTROPHILS NFR BLD AUTO: 48.7 %
NON HDL CHOLESTEROL: 173 MG/DL (ref 0–149)
NRBC BLD-RTO: 0 /100 WBCS (ref 0–0)
PLATELET # BLD AUTO: 213 X10*3/UL (ref 150–450)
POTASSIUM SERPL-SCNC: 4.5 MMOL/L (ref 3.5–5.3)
PROT SERPL-MCNC: 7.9 G/DL (ref 6.4–8.2)
PSA SERPL-MCNC: 0.9 NG/ML
RBC # BLD AUTO: 4.73 X10*6/UL (ref 4.5–5.9)
SODIUM SERPL-SCNC: 142 MMOL/L (ref 136–145)
TRIGL SERPL-MCNC: 179 MG/DL (ref 0–149)
TSH SERPL-ACNC: 2.85 MIU/L (ref 0.44–3.98)
VLDL: 36 MG/DL (ref 0–40)
WBC # BLD AUTO: 5.3 X10*3/UL (ref 4.4–11.3)

## 2024-06-03 PROCEDURE — 84153 ASSAY OF PSA TOTAL: CPT

## 2024-06-03 PROCEDURE — 80061 LIPID PANEL: CPT

## 2024-06-03 PROCEDURE — 80053 COMPREHEN METABOLIC PANEL: CPT

## 2024-06-03 PROCEDURE — 85025 COMPLETE CBC W/AUTO DIFF WBC: CPT

## 2024-06-03 PROCEDURE — 36415 COLL VENOUS BLD VENIPUNCTURE: CPT

## 2024-06-03 PROCEDURE — 84443 ASSAY THYROID STIM HORMONE: CPT

## 2024-06-06 DIAGNOSIS — E78.5 HYPERLIPIDEMIA, UNSPECIFIED HYPERLIPIDEMIA TYPE: ICD-10-CM

## 2024-06-06 RX ORDER — ROSUVASTATIN CALCIUM 5 MG/1
5 TABLET, COATED ORAL DAILY
Qty: 100 TABLET | Refills: 0 | Status: SHIPPED | OUTPATIENT
Start: 2024-06-06

## 2024-09-03 ENCOUNTER — APPOINTMENT (OUTPATIENT)
Dept: PRIMARY CARE | Facility: CLINIC | Age: 64
End: 2024-09-03
Payer: COMMERCIAL

## 2024-09-03 VITALS
HEIGHT: 69 IN | HEART RATE: 46 BPM | DIASTOLIC BLOOD PRESSURE: 75 MMHG | SYSTOLIC BLOOD PRESSURE: 134 MMHG | BODY MASS INDEX: 26.36 KG/M2 | RESPIRATION RATE: 16 BRPM | WEIGHT: 178 LBS

## 2024-09-03 DIAGNOSIS — R35.0 BENIGN PROSTATIC HYPERPLASIA WITH URINARY FREQUENCY: ICD-10-CM

## 2024-09-03 DIAGNOSIS — R53.83 OTHER FATIGUE: ICD-10-CM

## 2024-09-03 DIAGNOSIS — F17.211 TOBACCO DEPENDENCE DUE TO CIGARETTES, IN REMISSION: ICD-10-CM

## 2024-09-03 DIAGNOSIS — I10 PRIMARY HYPERTENSION: ICD-10-CM

## 2024-09-03 DIAGNOSIS — E78.5 HYPERLIPIDEMIA, UNSPECIFIED HYPERLIPIDEMIA TYPE: ICD-10-CM

## 2024-09-03 DIAGNOSIS — R00.1 BRADYCARDIA: Primary | ICD-10-CM

## 2024-09-03 DIAGNOSIS — N40.0 BENIGN PROSTATIC HYPERPLASIA WITHOUT LOWER URINARY TRACT SYMPTOMS: ICD-10-CM

## 2024-09-03 DIAGNOSIS — N40.1 BENIGN PROSTATIC HYPERPLASIA WITH URINARY FREQUENCY: ICD-10-CM

## 2024-09-03 DIAGNOSIS — I25.10 CORONARY ARTERY DISEASE INVOLVING NATIVE CORONARY ARTERY OF NATIVE HEART, UNSPECIFIED WHETHER ANGINA PRESENT: ICD-10-CM

## 2024-09-03 PROCEDURE — 3008F BODY MASS INDEX DOCD: CPT | Performed by: INTERNAL MEDICINE

## 2024-09-03 PROCEDURE — 99214 OFFICE O/P EST MOD 30 MIN: CPT | Performed by: INTERNAL MEDICINE

## 2024-09-03 PROCEDURE — 3078F DIAST BP <80 MM HG: CPT | Performed by: INTERNAL MEDICINE

## 2024-09-03 PROCEDURE — 3075F SYST BP GE 130 - 139MM HG: CPT | Performed by: INTERNAL MEDICINE

## 2024-09-03 ASSESSMENT — ENCOUNTER SYMPTOMS
ALLERGIC/IMMUNOLOGIC NEGATIVE: 1
GASTROINTESTINAL NEGATIVE: 1
EYES NEGATIVE: 1
CONSTITUTIONAL NEGATIVE: 1
PSYCHIATRIC NEGATIVE: 1
NEUROLOGICAL NEGATIVE: 1
CARDIOVASCULAR NEGATIVE: 1
RESPIRATORY NEGATIVE: 1
ENDOCRINE NEGATIVE: 1
HEMATOLOGIC/LYMPHATIC NEGATIVE: 1
MUSCULOSKELETAL NEGATIVE: 1

## 2024-09-03 NOTE — ASSESSMENT & PLAN NOTE
Hypercholesterolemia - Monitor lipid profile and educate patient upon risks of high cholesterol and targets. Educate diet and change in lifestyle and increase in exercises - Refill: Atorvastatin and educate compliance with medication and diet.

## 2024-09-03 NOTE — ASSESSMENT & PLAN NOTE
Bradycardia with a heart rate of 42 to 46 BPM at rest and the patient  is Asymptomatic  now  - recommend to decrease the Metoprolol to 25 mg daily

## 2024-09-03 NOTE — ASSESSMENT & PLAN NOTE
Tobacco cessation - quit tobacco in April 16th 2024 and able so far to keep away from tobacco  - Educate risks of smoking (CAD/COPD/CANCER of the lung or urinary bladder/CVA). Educate life style changes and prescribe nicotine patch and Wellbutrin 150mg BID. Educate stress reduction.

## 2024-09-03 NOTE — PROGRESS NOTES
"Subjective   Patient ID: Tee Oneill is a 63 y.o. male who presents for Follow-up (Follow up ).    HPI     Review of Systems   Constitutional: Negative.    HENT: Negative.     Eyes: Negative.    Respiratory: Negative.     Cardiovascular: Negative.    Gastrointestinal: Negative.    Endocrine: Negative.    Musculoskeletal: Negative.    Skin: Negative.    Allergic/Immunologic: Negative.    Neurological: Negative.    Hematological: Negative.    Psychiatric/Behavioral: Negative.     All other systems reviewed and are negative.      Objective   /75   Pulse (!) 46   Resp 16   Ht 1.753 m (5' 9\")   Wt 80.7 kg (178 lb)   BMI 26.29 kg/m²   Blood pressure 134/75, pulse (!) 46, resp. rate 16, height 1.753 m (5' 9\"), weight 80.7 kg (178 lb).   Physical Exam  Vitals and nursing note reviewed.   Constitutional:       Appearance: Normal appearance.   HENT:      Head: Normocephalic and atraumatic.      Right Ear: Tympanic membrane, ear canal and external ear normal.      Left Ear: Tympanic membrane, ear canal and external ear normal. There is no impacted cerumen.      Nose: Nose normal.      Mouth/Throat:      Mouth: Mucous membranes are moist.      Pharynx: Oropharynx is clear.   Eyes:      Extraocular Movements: Extraocular movements intact.      Conjunctiva/sclera: Conjunctivae normal.      Pupils: Pupils are equal, round, and reactive to light.   Cardiovascular:      Rate and Rhythm: Normal rate and regular rhythm.      Pulses: Normal pulses.      Heart sounds: Normal heart sounds. No murmur heard.  Pulmonary:      Effort: Pulmonary effort is normal. No respiratory distress.      Breath sounds: Normal breath sounds. No stridor. No wheezing, rhonchi or rales.   Chest:      Chest wall: No tenderness.   Abdominal:      General: Abdomen is flat. Bowel sounds are normal. There is no distension.      Palpations: Abdomen is soft. There is no mass.      Tenderness: There is no abdominal tenderness. There is no right CVA " tenderness, left CVA tenderness, guarding or rebound.      Hernia: No hernia is present.   Musculoskeletal:         General: Normal range of motion.      Cervical back: Normal range of motion and neck supple.   Skin:     General: Skin is warm.      Capillary Refill: Capillary refill takes less than 2 seconds.   Neurological:      General: No focal deficit present.      Mental Status: He is alert.      Cranial Nerves: No cranial nerve deficit.      Sensory: No sensory deficit.      Motor: No weakness.      Coordination: Coordination normal.      Gait: Gait normal.      Deep Tendon Reflexes: Reflexes normal.   Psychiatric:         Mood and Affect: Mood normal.         Behavior: Behavior normal. Behavior is cooperative.         Thought Content: Thought content normal.         Judgment: Judgment normal.         Assessment/Plan   Problem List Items Addressed This Visit             ICD-10-CM    CAD (coronary artery disease) I25.10     CAD-coronary artery disease-patient has a history of myocardial infarction/stent placed in stenosed coronary arteries. Target LDL should be below 70 milligrams per deciliter. Reviewed EKG which shows no significant changes. Follows with his cardiologist/ Dr. Huizar. He needs to call us with any new symptoms of angina or shortness of breath. Last stress test was/last coronary catheterization was/. Need to address risk factors BioCore controlling cholesterol blood pressure and diabetes. Educate extensively diet. Patient needs to follow in rehabilitation/mild exercises daily.          Relevant Orders    Comprehensive Metabolic Panel    Lipid Panel    HTN (hypertension) I10     HTN - hypertension well/controlled .Target BP < 130/80 achieved. Educate low salt diet and exercise with weight loss. Educate home self monitoring and diary keeping. Educate risks of elevate blood pressure and benefits of prompt treatment. Refill lisinopril and amlodipine          Hyperlipidemia E78.5      Hypercholesterolemia - Monitor lipid profile and educate patient upon risks of high cholesterol and targets. Educate diet and change in lifestyle and increase in exercises - Refill: Atorvastatin and educate compliance with medication and diet.          Relevant Orders    Lipid Panel    Other fatigue R53.83    Relevant Orders    CBC and Auto Differential    TSH with reflex to Free T4 if abnormal    Tobacco dependence due to cigarettes, in remission F17.211     Tobacco cessation - quit tobacco in April 16th 2024 and able so far to keep away from tobacco  - Educate risks of smoking (CAD/COPD/CANCER of the lung or urinary bladder/CVA). Educate life style changes and prescribe nicotine patch and Wellbutrin 150mg BID. Educate stress reduction.                                                                                      Benign prostatic hyperplasia N40.0     BPH - Benign PROSTATIC Hypertrophy, + Dysuria/Nocturia, check PSA, prescribe Flomax 0.4mg qD and Avodart 0.5 mg qD vs. Finasteride 5 mg qD.  Educate exercises and Change in life style, prescribe vitamin E 400 IU qD. Consider referral to urology.          Relevant Orders    Prostate Specific Antigen    Bradycardia - Primary R00.1     Bradycardia with a heart rate of 42 to 46 BPM at rest and the patient  is Asymptomatic  now  - recommend to decrease the Metoprolol to 25 mg daily             CAD (coronary artery disease) I25.10        CAD-coronary artery disease-patient has a history of myocardial infarction/stent placed in stenosed coronary arteries. Target LDL should be below 70 milligrams per deciliter. Reviewed EKG which shows no significant changes. Follows with his cardiologist/ Dr. Huizar. He needs to call us with any new symptoms of angina or shortness of breath. Last stress test was/last coronary catheterization was/. Need to address risk factors BioCore controlling cholesterol blood pressure and diabetes. Educate extensively diet. Patient needs to follow  in rehabilitation/mild exercises daily.            HTN (hypertension) - Primary I10       HTN - hypertension well/controlled .Target BP < 130/80  achieved. Educate low salt diet and exercise with weight loss. Educate home self monitoring and diary keeping. Educate risks of elevate blood pressure and benefits of prompt treatment.  Refill lisinopril and amlodipine            Hyperlipidemia E78.5       Hypercholesterolemia - Monitor lipid profile and educate patient upon risks of high cholesterol and targets. Educate diet and change in lifestyle and increase in exercises - Refill:  Atorvastatin    and educate compliance with medication and diet.             Tobacco dependence syndrome F17.200       Tobacco cessation quit tobacco April 16th 2024 and able to keep off  - Educate risks of smoking (CAD/COPD/CANCER of the lung or urinary bladder/CVA). Educate life style changes and prescribe nicotine patch and Wellbutrin 150mg BID. Educate stress reduction.                                                                                        Benign prostatic hyperplasia N40.0       BPH - Benign PROSTATIC Hypertrophy, + Dysuria/Nocturia, check PSA, prescribe Flomax 0.4mg qD and Avodart 0.5 mg qD vs. Finasteride 5 mg qD.  Educate exercises and Change in life style, prescribe vitamin E 400 IU qD. Consider referral to urology.                       BRBPR (bright red blood per rectum) K62.5          Refer to colonoscopy            CAD (coronary artery disease) I25.10        CAD-coronary artery disease-patient has a history of myocardial infarction/stent placed in stenosed coronary arteries. Target LDL should be below 70 milligrams per deciliter. Reviewed EKG which shows no significant changes. Follows with his cardiologist/ Dr. Huizar. He needs to call us with any new symptoms of angina or shortness of breath. Last stress test was/last coronary catheterization was/. Need to address risk factors BioCore controlling cholesterol  blood pressure and diabetes. Educate extensively diet. Patient needs to follow in rehabilitation/mild exercises daily.            Relevant Orders      Comprehensive Metabolic Panel      Lipid Panel      HTN (hypertension) I10        HTN - hypertension well/controlled .Target BP < 130/80  achieved. Educate low salt diet and exercise with weight loss. Educate home self monitoring and diary keeping. Educate risks of elevate blood pressure and benefits of prompt treatment.  Refill lisinopril and amlodipine           Hyperlipidemia E78.5        Hypercholesterolemia - Monitor lipid profile and educate patient upon risks of high cholesterol and targets. Educate diet and change in lifestyle and increase in exercises - Refill:  Atorvastatin    and educate compliance with medication and diet.             Relevant Orders      Lipid Panel      Status post angioplasty Z98.62        Status Post coronary stenting in 2019            Uncomplicated alcohol dependence (Multi) F10.20      Tobacco dependence syndrome F17.200        Quit tobacco one month ago            Relevant Orders      CT lung screening low dose      Other fatigue R53.83      Relevant Orders      CBC and Auto Differential      TSH with reflex to Free T4 if abnormal      Tobacco dependence due to cigarettes, in remission F17.211      Relevant Orders      CT lung screening low dose                      Other Visit Diagnoses           Codes     Colon cancer screening    -  Primary Z12.11     Relevant Orders     Colonoscopy Screening; Average Risk Patient     Benign prostatic hyperplasia without lower urinary tract symptoms     N40.0     Relevant Orders     Prostate Specific Antigen

## 2024-09-20 ENCOUNTER — APPOINTMENT (OUTPATIENT)
Dept: CARDIOLOGY | Facility: CLINIC | Age: 64
End: 2024-09-20
Payer: COMMERCIAL

## 2024-09-23 ENCOUNTER — OFFICE VISIT (OUTPATIENT)
Dept: CARDIOLOGY | Facility: CLINIC | Age: 64
End: 2024-09-23
Payer: COMMERCIAL

## 2024-09-23 VITALS
WEIGHT: 172.2 LBS | DIASTOLIC BLOOD PRESSURE: 77 MMHG | SYSTOLIC BLOOD PRESSURE: 118 MMHG | HEIGHT: 69 IN | HEART RATE: 62 BPM | BODY MASS INDEX: 25.51 KG/M2 | OXYGEN SATURATION: 98 %

## 2024-09-23 DIAGNOSIS — I25.10 CORONARY ARTERY DISEASE INVOLVING NATIVE CORONARY ARTERY OF NATIVE HEART, UNSPECIFIED WHETHER ANGINA PRESENT: Primary | ICD-10-CM

## 2024-09-23 PROCEDURE — 3078F DIAST BP <80 MM HG: CPT | Performed by: INTERNAL MEDICINE

## 2024-09-23 PROCEDURE — 3074F SYST BP LT 130 MM HG: CPT | Performed by: INTERNAL MEDICINE

## 2024-09-23 PROCEDURE — 3008F BODY MASS INDEX DOCD: CPT | Performed by: INTERNAL MEDICINE

## 2024-09-23 PROCEDURE — 99214 OFFICE O/P EST MOD 30 MIN: CPT | Performed by: INTERNAL MEDICINE

## 2024-09-23 ASSESSMENT — ENCOUNTER SYMPTOMS
VOMITING: 0
DYSURIA: 0
MYALGIAS: 0
BLOATING: 0
CHILLS: 0
FEVER: 0
COUGH: 0
FALLS: 0
MEMORY LOSS: 0
LOSS OF SENSATION IN FEET: 0
DIARRHEA: 0
OCCASIONAL FEELINGS OF UNSTEADINESS: 0
ALTERED MENTAL STATUS: 0
HEMOPTYSIS: 0
ABDOMINAL PAIN: 0
HEADACHES: 0
DEPRESSION: 0
HEMATURIA: 0
WHEEZING: 0
NAUSEA: 0
CONSTIPATION: 0

## 2024-09-23 ASSESSMENT — PAIN SCALES - GENERAL: PAINLEVEL: 0-NO PAIN

## 2024-09-23 ASSESSMENT — PATIENT HEALTH QUESTIONNAIRE - PHQ9
SUM OF ALL RESPONSES TO PHQ9 QUESTIONS 1 AND 2: 0
2. FEELING DOWN, DEPRESSED OR HOPELESS: NOT AT ALL
1. LITTLE INTEREST OR PLEASURE IN DOING THINGS: NOT AT ALL

## 2024-09-23 ASSESSMENT — COLUMBIA-SUICIDE SEVERITY RATING SCALE - C-SSRS
2. HAVE YOU ACTUALLY HAD ANY THOUGHTS OF KILLING YOURSELF?: NO
6. HAVE YOU EVER DONE ANYTHING, STARTED TO DO ANYTHING, OR PREPARED TO DO ANYTHING TO END YOUR LIFE?: NO
1. IN THE PAST MONTH, HAVE YOU WISHED YOU WERE DEAD OR WISHED YOU COULD GO TO SLEEP AND NOT WAKE UP?: NO

## 2024-09-23 NOTE — PROGRESS NOTES
Chief Complaint   Patient presents with    Follow-up    Coronary Artery Disease       HPI  62 yo BM w/ h/o CAD s/p LCx/RCA stents , mild CM, AFLUT x1 (), HPL, TOB (quit ) now here for cardiology f/u. He is feeling good. No further CP (was having some on reduced BB dose). No further L shoulder/neck pain.   No dyspnea. No palps. No LH/dizziness/syncope. No edema. No claudication. No cough.   BP at home: ~120/80  ECG 4/15: SR (81)  ECG : SR (71), nonsp T-wave changes  ECG : SB (54), inflat St-T changes  ECG : SB (59), PVC, ST-T changes  ECG : SB (53), ST-T changes  ECG : AFLUT (127)  ECG : SB (56), bigeminal PVCs, ST-T changes  ECG : SR (61), nonsp T-wave changes  ECG 10/22: SR (61), PVC, nonsp T-wave changes  Echo : EF 40-45%  Echo : TDS, EF 45%, DD, valves ok  ETT : CP + 2.5mm ST dep  CCS : 188 (LM 0, , LCx 0, RCA 72), small L lung nodularity  Cath : LM ok, mLAD  (L->L collaterals), pLCx 90% (BOWEN), OM1 , p-dRCA 80% (DESx2)  CXR 4/15: no acute abnl  CXR : no acute abnl   CT lung : CAC/stent, nl heart size, no peric eff, mild AA, nl PA    Review of Systems   Constitutional: Negative for chills, fever and malaise/fatigue.   HENT:  Negative for hearing loss.    Eyes:  Negative for visual disturbance.   Respiratory:  Negative for cough, hemoptysis and wheezing.    Skin:  Negative for rash.   Musculoskeletal:  Negative for falls and myalgias.   Gastrointestinal:  Negative for bloating, abdominal pain, constipation, diarrhea, dysphagia, nausea and vomiting.   Genitourinary:  Negative for dysuria and hematuria.   Neurological:  Negative for headaches.   Psychiatric/Behavioral:  Negative for altered mental status, depression and memory loss.       Social History     Tobacco Use   Smoking Status Former    Current packs/day: 0.00    Types: Cigarettes    Quit date: 3/16/2024    Years since quittin.5   Smokeless Tobacco Never      Family History    Problem Relation Name Age of Onset    Other (HTN) Mother      Other (HTN) Father        No Known Allergies     Current Outpatient Medications   Medication Instructions    amLODIPine (NORVASC) 10 mg, oral, Daily    aspirin 81 mg EC tablet oral    atorvastatin (LIPITOR) 80 mg, oral, Daily    clopidogrel (PLAVIX) 75 mg, oral, Daily    lisinopril 80 mg, oral, Daily    metoprolol succinate XL (TOPROL-XL) 50 mg, oral, Daily    nitroglycerin (Nitrostat) 0.4 mg SL tablet sublingual      Vitals:    09/23/24 0837   BP: 118/77   Pulse: 62   SpO2: 98%      Physical Exam  Constitutional:       Appearance: Normal appearance.   HENT:      Head: Normocephalic and atraumatic.      Nose: Nose normal.   Neck:      Vascular: No carotid bruit.   Cardiovascular:      Rate and Rhythm: Normal rate and regular rhythm.      Heart sounds: No murmur heard.  Pulmonary:      Effort: Pulmonary effort is normal.      Breath sounds: Normal breath sounds.   Abdominal:      Palpations: Abdomen is soft.      Tenderness: There is no abdominal tenderness.   Musculoskeletal:      Right lower leg: No edema.      Left lower leg: No edema.   Skin:     General: Skin is warm and dry.   Neurological:      General: No focal deficit present.      Mental Status: He is alert.   Psychiatric:         Mood and Affect: Mood normal.         Judgment: Judgment normal.        Results/Data  6/24 Cr 1.0, K 4.5, LFT nl, , HDL 61, , Chol 234, HGB 14.2, , TSH 2.85  2/21 Cr 0.92, K 4.3, LFT nl, LDL 66, HDL 53, , Chol 160, HGB 15,   5/18 Cr 0.94, K 4.6, LFT nl, LDL 77, HDL 49, , Chol 158, HGB 15,   9/17 Cr 0.98, K 4.1, Mg 2.18, LFT nl, LDL **, HDL 36, , Chol 257, HGB 16.2, , hgba1c 5.7, TSH 1.54, TPN neg, BNP 85  4/15 Cr 0.87, K 3.6, HGB 13, , TPN 0.1     Assessment/Plan   62 yo BM w/ h/o CAD s/p LCx/RCA stents 9/17, mild CM, AFLUT x1 (9/17), HPL, TOB (quit 4/24). Doing well. No cardiac symptoms.   EF remains  very mildly reduced. Continue BB/ACE.  -continue ASA 81 qd  -continue Plavix 75 qd  -continue Metoprolol Succinate 50 qd (HR occ low to increase)  -continue Lisinopril 80 qd  -continue Amlodipine 10 qd  -continue Atorva 80 qhs (make sure taking daily) -> goal LDL <70 [his recent LDL suggests he was missing some Atorva; he is getting repeat 12/24)  -f/u 6 months (earlier if needed)     Tee Jason MD

## 2024-12-02 ENCOUNTER — LAB (OUTPATIENT)
Dept: LAB | Facility: LAB | Age: 64
End: 2024-12-02
Payer: COMMERCIAL

## 2024-12-02 DIAGNOSIS — E78.5 HYPERLIPIDEMIA, UNSPECIFIED HYPERLIPIDEMIA TYPE: ICD-10-CM

## 2024-12-02 DIAGNOSIS — I25.10 CORONARY ARTERY DISEASE INVOLVING NATIVE CORONARY ARTERY OF NATIVE HEART, UNSPECIFIED WHETHER ANGINA PRESENT: ICD-10-CM

## 2024-12-02 DIAGNOSIS — R53.83 OTHER FATIGUE: ICD-10-CM

## 2024-12-02 DIAGNOSIS — N40.0 BENIGN PROSTATIC HYPERPLASIA WITHOUT LOWER URINARY TRACT SYMPTOMS: ICD-10-CM

## 2024-12-02 LAB
ALBUMIN SERPL BCP-MCNC: 4.7 G/DL (ref 3.4–5)
ALP SERPL-CCNC: 82 U/L (ref 33–136)
ALT SERPL W P-5'-P-CCNC: 19 U/L (ref 10–52)
ANION GAP SERPL CALC-SCNC: 13 MMOL/L (ref 10–20)
AST SERPL W P-5'-P-CCNC: 20 U/L (ref 9–39)
BASOPHILS # BLD AUTO: 0.03 X10*3/UL (ref 0–0.1)
BASOPHILS NFR BLD AUTO: 0.5 %
BILIRUB SERPL-MCNC: 0.5 MG/DL (ref 0–1.2)
BUN SERPL-MCNC: 15 MG/DL (ref 6–23)
CALCIUM SERPL-MCNC: 9.7 MG/DL (ref 8.6–10.6)
CHLORIDE SERPL-SCNC: 107 MMOL/L (ref 98–107)
CHOLEST SERPL-MCNC: 216 MG/DL (ref 0–199)
CHOLESTEROL/HDL RATIO: 4
CO2 SERPL-SCNC: 29 MMOL/L (ref 21–32)
CREAT SERPL-MCNC: 1.11 MG/DL (ref 0.5–1.3)
EGFRCR SERPLBLD CKD-EPI 2021: 75 ML/MIN/1.73M*2
EOSINOPHIL # BLD AUTO: 0.45 X10*3/UL (ref 0–0.7)
EOSINOPHIL NFR BLD AUTO: 6.8 %
ERYTHROCYTE [DISTWIDTH] IN BLOOD BY AUTOMATED COUNT: 14.6 % (ref 11.5–14.5)
GLUCOSE SERPL-MCNC: 108 MG/DL (ref 74–99)
HCT VFR BLD AUTO: 43.8 % (ref 41–52)
HDLC SERPL-MCNC: 53.8 MG/DL
HGB BLD-MCNC: 14.4 G/DL (ref 13.5–17.5)
IMM GRANULOCYTES # BLD AUTO: 0.01 X10*3/UL (ref 0–0.7)
IMM GRANULOCYTES NFR BLD AUTO: 0.2 % (ref 0–0.9)
LDLC SERPL CALC-MCNC: 116 MG/DL
LYMPHOCYTES # BLD AUTO: 2.41 X10*3/UL (ref 1.2–4.8)
LYMPHOCYTES NFR BLD AUTO: 36.6 %
MCH RBC QN AUTO: 30.2 PG (ref 26–34)
MCHC RBC AUTO-ENTMCNC: 32.9 G/DL (ref 32–36)
MCV RBC AUTO: 92 FL (ref 80–100)
MONOCYTES # BLD AUTO: 0.61 X10*3/UL (ref 0.1–1)
MONOCYTES NFR BLD AUTO: 9.3 %
NEUTROPHILS # BLD AUTO: 3.08 X10*3/UL (ref 1.2–7.7)
NEUTROPHILS NFR BLD AUTO: 46.6 %
NON HDL CHOLESTEROL: 162 MG/DL (ref 0–149)
NRBC BLD-RTO: 0 /100 WBCS (ref 0–0)
PLATELET # BLD AUTO: 257 X10*3/UL (ref 150–450)
POTASSIUM SERPL-SCNC: 4.5 MMOL/L (ref 3.5–5.3)
PROT SERPL-MCNC: 7.9 G/DL (ref 6.4–8.2)
PSA SERPL-MCNC: 0.78 NG/ML
RBC # BLD AUTO: 4.77 X10*6/UL (ref 4.5–5.9)
SODIUM SERPL-SCNC: 144 MMOL/L (ref 136–145)
TRIGL SERPL-MCNC: 230 MG/DL (ref 0–149)
TSH SERPL-ACNC: 1.5 MIU/L (ref 0.44–3.98)
VLDL: 46 MG/DL (ref 0–40)
WBC # BLD AUTO: 6.6 X10*3/UL (ref 4.4–11.3)

## 2024-12-02 PROCEDURE — 80053 COMPREHEN METABOLIC PANEL: CPT

## 2024-12-02 PROCEDURE — 80061 LIPID PANEL: CPT

## 2024-12-02 PROCEDURE — 84443 ASSAY THYROID STIM HORMONE: CPT

## 2024-12-02 PROCEDURE — 84153 ASSAY OF PSA TOTAL: CPT

## 2024-12-02 PROCEDURE — 85025 COMPLETE CBC W/AUTO DIFF WBC: CPT

## 2024-12-02 PROCEDURE — 36415 COLL VENOUS BLD VENIPUNCTURE: CPT

## 2024-12-05 ENCOUNTER — APPOINTMENT (OUTPATIENT)
Dept: PRIMARY CARE | Facility: CLINIC | Age: 64
End: 2024-12-05
Payer: COMMERCIAL

## 2025-01-14 ENCOUNTER — APPOINTMENT (OUTPATIENT)
Dept: PRIMARY CARE | Facility: CLINIC | Age: 65
End: 2025-01-14
Payer: COMMERCIAL

## 2025-02-03 DIAGNOSIS — E78.5 HYPERLIPIDEMIA, UNSPECIFIED HYPERLIPIDEMIA TYPE: ICD-10-CM

## 2025-02-03 RX ORDER — ROSUVASTATIN CALCIUM 5 MG/1
5 TABLET, COATED ORAL DAILY
Qty: 100 TABLET | Refills: 0 | OUTPATIENT
Start: 2025-02-03

## 2025-02-05 ENCOUNTER — TELEPHONE (OUTPATIENT)
Dept: CARDIOLOGY | Facility: HOSPITAL | Age: 65
End: 2025-02-05

## 2025-02-05 DIAGNOSIS — I10 HYPERTENSION, UNSPECIFIED TYPE: ICD-10-CM

## 2025-02-05 RX ORDER — CLOPIDOGREL BISULFATE 75 MG/1
75 TABLET ORAL DAILY
Qty: 90 TABLET | Refills: 3 | Status: SHIPPED | OUTPATIENT
Start: 2025-02-05

## 2025-02-05 RX ORDER — LISINOPRIL 40 MG/1
40 TABLET ORAL DAILY
Qty: 90 TABLET | Refills: 3 | Status: SHIPPED | OUTPATIENT
Start: 2025-02-05

## 2025-02-05 RX ORDER — METOPROLOL SUCCINATE 50 MG/1
50 TABLET, EXTENDED RELEASE ORAL DAILY
Qty: 90 TABLET | Refills: 3 | Status: SHIPPED | OUTPATIENT
Start: 2025-02-05

## 2025-02-05 RX ORDER — ATORVASTATIN CALCIUM 80 MG/1
80 TABLET, FILM COATED ORAL DAILY
Qty: 90 TABLET | Refills: 3 | Status: SHIPPED | OUTPATIENT
Start: 2025-02-05

## 2025-02-05 RX ORDER — AMLODIPINE BESYLATE 10 MG/1
10 TABLET ORAL DAILY
Qty: 90 TABLET | Refills: 3 | Status: SHIPPED | OUTPATIENT
Start: 2025-02-05

## 2025-03-17 ENCOUNTER — OFFICE VISIT (OUTPATIENT)
Dept: CARDIOLOGY | Facility: CLINIC | Age: 65
End: 2025-03-17
Payer: MEDICAID

## 2025-03-17 VITALS
WEIGHT: 178 LBS | OXYGEN SATURATION: 98 % | BODY MASS INDEX: 26.36 KG/M2 | SYSTOLIC BLOOD PRESSURE: 133 MMHG | HEIGHT: 69 IN | HEART RATE: 40 BPM | DIASTOLIC BLOOD PRESSURE: 77 MMHG

## 2025-03-17 DIAGNOSIS — Z98.62 STATUS POST ANGIOPLASTY: ICD-10-CM

## 2025-03-17 DIAGNOSIS — R00.1 BRADYCARDIA: Primary | ICD-10-CM

## 2025-03-17 DIAGNOSIS — I25.10 CORONARY ARTERY DISEASE INVOLVING NATIVE CORONARY ARTERY OF NATIVE HEART, UNSPECIFIED WHETHER ANGINA PRESENT: ICD-10-CM

## 2025-03-17 DIAGNOSIS — I10 HYPERTENSION, UNSPECIFIED TYPE: ICD-10-CM

## 2025-03-17 DIAGNOSIS — E78.5 HYPERLIPIDEMIA, UNSPECIFIED HYPERLIPIDEMIA TYPE: ICD-10-CM

## 2025-03-17 DIAGNOSIS — I42.9 CARDIOMYOPATHY, UNSPECIFIED TYPE (MULTI): ICD-10-CM

## 2025-03-17 PROBLEM — R93.1 ELEVATED CORONARY ARTERY CALCIUM SCORE: Status: RESOLVED | Noted: 2024-03-19 | Resolved: 2025-03-17

## 2025-03-17 LAB
ATRIAL RATE: 74 BPM
P AXIS: 54 DEGREES
P OFFSET: 204 MS
P ONSET: 145 MS
PR INTERVAL: 146 MS
Q ONSET: 218 MS
QRS COUNT: 12 BEATS
QRS DURATION: 98 MS
QT INTERVAL: 444 MS
QTC CALCULATION(BAZETT): 492 MS
QTC FREDERICIA: 476 MS
R AXIS: 6 DEGREES
T AXIS: -20 DEGREES
T OFFSET: 440 MS
VENTRICULAR RATE: 74 BPM

## 2025-03-17 PROCEDURE — 1036F TOBACCO NON-USER: CPT | Performed by: INTERNAL MEDICINE

## 2025-03-17 PROCEDURE — 99214 OFFICE O/P EST MOD 30 MIN: CPT | Performed by: INTERNAL MEDICINE

## 2025-03-17 PROCEDURE — 3008F BODY MASS INDEX DOCD: CPT | Performed by: INTERNAL MEDICINE

## 2025-03-17 PROCEDURE — 93005 ELECTROCARDIOGRAM TRACING: CPT | Performed by: INTERNAL MEDICINE

## 2025-03-17 PROCEDURE — 3075F SYST BP GE 130 - 139MM HG: CPT | Performed by: INTERNAL MEDICINE

## 2025-03-17 PROCEDURE — 3078F DIAST BP <80 MM HG: CPT | Performed by: INTERNAL MEDICINE

## 2025-03-17 RX ORDER — ROSUVASTATIN CALCIUM 40 MG/1
40 TABLET, COATED ORAL DAILY
Qty: 90 TABLET | Refills: 3 | Status: SHIPPED | OUTPATIENT
Start: 2025-03-17 | End: 2026-03-17

## 2025-03-17 ASSESSMENT — ENCOUNTER SYMPTOMS
DYSURIA: 0
LOSS OF SENSATION IN FEET: 0
BLOATING: 0
VOMITING: 0
HEADACHES: 0
ABDOMINAL PAIN: 0
WHEEZING: 0
COUGH: 0
ALTERED MENTAL STATUS: 0
MYALGIAS: 0
FEVER: 0
FALLS: 0
CONSTIPATION: 0
HEMATURIA: 0
DEPRESSION: 0
NAUSEA: 0
HEMOPTYSIS: 0
MEMORY LOSS: 0
CHILLS: 0
DIARRHEA: 0
OCCASIONAL FEELINGS OF UNSTEADINESS: 0

## 2025-03-17 ASSESSMENT — COLUMBIA-SUICIDE SEVERITY RATING SCALE - C-SSRS
1. IN THE PAST MONTH, HAVE YOU WISHED YOU WERE DEAD OR WISHED YOU COULD GO TO SLEEP AND NOT WAKE UP?: NO
6. HAVE YOU EVER DONE ANYTHING, STARTED TO DO ANYTHING, OR PREPARED TO DO ANYTHING TO END YOUR LIFE?: NO
2. HAVE YOU ACTUALLY HAD ANY THOUGHTS OF KILLING YOURSELF?: NO

## 2025-03-17 ASSESSMENT — PATIENT HEALTH QUESTIONNAIRE - PHQ9
1. LITTLE INTEREST OR PLEASURE IN DOING THINGS: NOT AT ALL
2. FEELING DOWN, DEPRESSED OR HOPELESS: NOT AT ALL
SUM OF ALL RESPONSES TO PHQ9 QUESTIONS 1 AND 2: 0

## 2025-03-17 NOTE — PROGRESS NOTES
Chief Complaint   Patient presents with    Follow-up    Coronary Artery Disease    Cardiomyopathy    Hypertension    Hyperlipidemia       HPI  65 yo BM w/ h/o CAD s/p LCx/RCA stents 9/17, mild CM, AFLUT x1 (9/17), HPL, TOB (quit 4/24) now here for cardiology f/u.   +rare CP at rest that resolves in a few minutes with SLTNG, no radiation, no assoc symptoms except occ nausea and burping.  No dyspnea. No palps. No LH/dizziness/syncope. No edema. No claudication. No cough.   BP at home: ~120/80  ECG 4/15: SR (81)  ECG 8/17: SR (71), nonsp T-wave changes  ECG 9/17: SB (54), inflat St-T changes  ECG 9/17: SB (59), PVC, ST-T changes  ECG 9/17: SB (53), ST-T changes  ECG 9/17: AFLUT (127)  ECG 9/18: SB (56), bigeminal PVCs, ST-T changes  ECG 9/19: SR (61), nonsp T-wave changes  ECG 10/22: SR (61), PVC, nonsp T-wave changes  ECG 3/25: SR (74), freq PVCs/bigeminy, nonsp T-wave changes  Echo 9/17: EF 40-45%  Echo 8/21: TDS, EF 45%, DD, valves ok  ETT 8/17: CP + 2.5mm ST dep  CCS 8/17: 188 (LM 0, , LCx 0, RCA 72), small L lung nodularity  Cath 9/17: LM ok, mLAD  (L->L collaterals), pLCx 90% (BOWEN), OM1 , p-dRCA 80% (DESx2)  CXR 4/15: no acute abnl  CXR 9/17: no acute abnl   CT lung 5/24: CAC/stent, nl heart size, no peric eff, mild AA, nl PA    Review of Systems   Constitutional: Negative for chills, fever and malaise/fatigue.   HENT:  Negative for hearing loss.    Eyes:  Negative for visual disturbance.   Respiratory:  Negative for cough, hemoptysis and wheezing.    Skin:  Negative for rash.   Musculoskeletal:  Negative for falls and myalgias.   Gastrointestinal:  Negative for bloating, abdominal pain, constipation, diarrhea, dysphagia, nausea and vomiting.   Genitourinary:  Negative for dysuria and hematuria.   Neurological:  Negative for headaches.   Psychiatric/Behavioral:  Negative for altered mental status, depression and memory loss.       Social History     Tobacco Use   Smoking Status Former    Current  packs/day: 0.00    Types: Cigarettes    Quit date: 3/16/2024    Years since quittin.0   Smokeless Tobacco Never      Family History   Problem Relation Name Age of Onset    Other (HTN) Mother      Other (HTN) Father        No Known Allergies     Current Outpatient Medications   Medication Instructions    amLODIPine (NORVASC) 10 mg, oral, Daily    aspirin 81 mg EC tablet Take by mouth.    clopidogrel (PLAVIX) 75 mg, oral, Daily    lisinopril 40 mg, oral, Daily    metoprolol succinate XL (TOPROL-XL) 50 mg, oral, Daily    nitroglycerin (Nitrostat) 0.4 mg SL tablet Place under the tongue.       Vitals:    25 0817   BP: 133/77   Pulse: (!) 40   SpO2: 98%      Physical Exam  Constitutional:       Appearance: Normal appearance.   HENT:      Head: Normocephalic and atraumatic.      Nose: Nose normal.   Neck:      Vascular: No carotid bruit.   Cardiovascular:      Rate and Rhythm: Normal rate and regular rhythm. Frequent Extrasystoles are present.     Heart sounds: No murmur heard.  Pulmonary:      Effort: Pulmonary effort is normal.      Breath sounds: Normal breath sounds.   Abdominal:      Palpations: Abdomen is soft.      Tenderness: There is no abdominal tenderness.   Musculoskeletal:      Right lower leg: No edema.      Left lower leg: No edema.   Skin:     General: Skin is warm and dry.   Neurological:      General: No focal deficit present.      Mental Status: He is alert.   Psychiatric:         Mood and Affect: Mood normal.         Judgment: Judgment normal.        Results/Data   Cr 1.11, K 4.5, LFT nl, , HDL 54, , Chol 216, HGB 14.4, , TSH 1.5   Cr 1.0, K 4.5, LFT nl, , HDL 61, , Chol 234, HGB 14.2, , TSH 2.85   Cr 0.92, K 4.3, LFT nl, LDL 66, HDL 53, , Chol 160, HGB 15,    Cr 0.94, K 4.6, LFT nl, LDL 77, HDL 49, , Chol 158, HGB 15,    Cr 0.98, K 4.1, Mg 2.18, LFT nl, LDL **, HDL 36, , Chol 257, HGB 16.2, ,  hgba1c 5.7, TSH 1.54, TPN neg, BNP 85  4/15 Cr 0.87, K 3.6, HGB 13, , TPN 0.1     Assessment/Plan   65 yo BM w/ h/o CAD s/p LCx/RCA stents 9/17, mild CM, AFLUT x1 (9/17), HPL, TOB (quit 4/24) now w/ rare CP, ?angina vs esoph spasm (since resolves w/ SLNTG). If increases, consider stress test (vs cath) and/or adding Imdur.  EF remains very mildly reduced. Continue BB/ACE.  -continue ASA 81 qd  -continue Plavix 75 qd  -continue Metoprolol Succinate 50 qd (HR occ low to increase)  -continue Lisinopril 40 qd  -continue Amlodipine 10 qd  -add Rosuva 40 qd (he stopped Atorva due to hearing it was recalled) -> goal LDL <70  -f/u 6 months (earlier if needed)     Tee Jason MD

## 2025-03-24 ENCOUNTER — APPOINTMENT (OUTPATIENT)
Dept: CARDIOLOGY | Facility: CLINIC | Age: 65
End: 2025-03-24
Payer: MEDICAID

## 2025-03-25 LAB
ATRIAL RATE: 74 BPM
P AXIS: 54 DEGREES
P OFFSET: 204 MS
P ONSET: 145 MS
PR INTERVAL: 146 MS
Q ONSET: 218 MS
QRS COUNT: 12 BEATS
QRS DURATION: 98 MS
QT INTERVAL: 404 MS
QTC CALCULATION(BAZETT): 449 MS
QTC FREDERICIA: 433 MS
R AXIS: 6 DEGREES
T AXIS: -20 DEGREES
T OFFSET: 440 MS
VENTRICULAR RATE: 74 BPM

## 2025-08-01 ENCOUNTER — APPOINTMENT (OUTPATIENT)
Dept: PRIMARY CARE | Facility: CLINIC | Age: 65
End: 2025-08-01
Payer: MEDICARE

## 2025-09-03 ENCOUNTER — APPOINTMENT (OUTPATIENT)
Dept: PRIMARY CARE | Facility: CLINIC | Age: 65
End: 2025-09-03
Payer: MEDICARE

## 2026-03-03 ENCOUNTER — APPOINTMENT (OUTPATIENT)
Dept: PRIMARY CARE | Facility: CLINIC | Age: 66
End: 2026-03-03
Payer: MEDICARE